# Patient Record
Sex: FEMALE | Race: WHITE | Employment: STUDENT | URBAN - METROPOLITAN AREA
[De-identification: names, ages, dates, MRNs, and addresses within clinical notes are randomized per-mention and may not be internally consistent; named-entity substitution may affect disease eponyms.]

---
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
## 2023-04-19 NOTE — LETTER
3/28/2017 1:50 PM 
 
Ms. Karli Balbuena 
[address] [facility] [address] [address] Dear Ms. Negro Fuller: 
 
It has come to my attention that we have not received results for the tests we ordered. If they have not yet been performed, please call scheduling at [phone] to have them completed. If you need a copy of the order(s) or need assistance in rescheduling the test(s), please contact my nurse at [phone]. If you have received this notification in error, please accept our apologies and contact our office ([phone]) to notify us. Sincerely, Shelly Mcguire MD 
 Pt. Fall protocol  Yellow armband on patient, yellow non skid socks on  Bed in low position, all side rails up, call bell in reach  Pt. And family instructed in \"call don't fall\" protocol  Use your call bell and wait for assistance, staff not family will assist you to get up and move about  Pt.  And family verbalize understanding of fall precautions and \"call don't fall\" protocol

## 2023-05-15 RX ORDER — PHENYLEPHRINE HCL 10 MG/1
TABLET, FILM COATED ORAL
COMMUNITY

## 2023-05-15 RX ORDER — ASCORBIC ACID 500 MG
500 TABLET ORAL DAILY
COMMUNITY

## (undated) DEVICE — FORCEPS BX L240CM JAW DIA2.8MM L CAP W/ NDL MIC MESH TOOTH

## (undated) DEVICE — CUFF BLD PRSS CHILD SM SZ 8 FOR 12-16CM LIMB VYN SFT W/O TB

## (undated) DEVICE — Z DISCONTINUED NO SUB IDED BITE BLOCK ENDOSCP PEDIATRIC 38 FR SLD POLYETH BLU BLOX

## (undated) DEVICE — KIT IV STRT W CHLORAPREP PD 1ML

## (undated) DEVICE — CONTAINER SPEC 20 ML LID NEUT BUFF FORMALIN 10 % POLYPR STS

## (undated) DEVICE — SET EXTN TBNG L BOR 4 W STPCOCK ST 32IN PRIMING VOL 6ML

## (undated) DEVICE — NEEDLE HYPO 18GA L1.5IN PNK S STL HUB POLYPR SHLD REG BVL

## (undated) DEVICE — SET ADMIN 16ML TBNG L100IN 2 Y INJ SITE IV PIGGY BK DISP

## (undated) DEVICE — KENDALL RADIOLUCENT FOAM MONITORING ELECTRODE RECTANGULAR SHAPE: Brand: KENDALL

## (undated) DEVICE — BAG SPEC BIOHZD LF 2MIL 6X10IN -- CONVERT TO ITEM 357326

## (undated) DEVICE — SOLIDIFIER FLUID 3000 CC ABSORB

## (undated) DEVICE — BAG BELONG PT PERS CLEAR HANDL

## (undated) DEVICE — CATH IV AUTOGRD BC BLU 22GA 25 -- INSYTE

## (undated) DEVICE — SYRINGE MED 20ML STD CLR PLAS LUERLOCK TIP N CTRL DISP

## (undated) DEVICE — BW-412T DISP COMBO CLEANING BRUSH: Brand: SINGLE USE COMBINATION CLEANING BRUSH

## (undated) DEVICE — NEONATAL-ADULT SPO2 SENSOR: Brand: NELLCOR

## (undated) DEVICE — 1200 GUARD II KIT W/5MM TUBE W/O VAC TUBE: Brand: GUARDIAN

## (undated) DEVICE — ENDO CARRY-ON PROCEDURE KIT INCLUDES ENZYMATIC SPONGE, GAUZE, BIOHAZARD LABEL, TRAY, LUBRICANT, DIRTY SCOPE LABEL, WATER LABEL, TRAY, DRAWSTRING PAD, AND DEFENDO 4-PIECE KIT.: Brand: ENDO CARRY-ON PROCEDURE KIT

## (undated) DEVICE — Z DISCONTINUED NO SUB IDED CANNULA NSL 65FT W O2 SAMP LN PED SHT TERM END TDL FILTERLN